# Patient Record
Sex: FEMALE | Race: WHITE | NOT HISPANIC OR LATINO | Employment: UNEMPLOYED | URBAN - METROPOLITAN AREA
[De-identification: names, ages, dates, MRNs, and addresses within clinical notes are randomized per-mention and may not be internally consistent; named-entity substitution may affect disease eponyms.]

---

## 2018-07-20 ENCOUNTER — OFFICE VISIT (OUTPATIENT)
Dept: URGENT CARE | Facility: CLINIC | Age: 53
End: 2018-07-20
Payer: MEDICAID

## 2018-07-20 VITALS
OXYGEN SATURATION: 100 % | SYSTOLIC BLOOD PRESSURE: 110 MMHG | TEMPERATURE: 99 F | WEIGHT: 143 LBS | HEIGHT: 63 IN | HEART RATE: 84 BPM | RESPIRATION RATE: 18 BRPM | DIASTOLIC BLOOD PRESSURE: 60 MMHG | BODY MASS INDEX: 25.34 KG/M2

## 2018-07-20 DIAGNOSIS — N76.0 ACUTE VAGINITIS: Primary | ICD-10-CM

## 2018-07-20 DIAGNOSIS — N89.8 VAGINAL IRRITATION: ICD-10-CM

## 2018-07-20 LAB
BILIRUB UR QL STRIP: NEGATIVE
GLUCOSE UR QL STRIP: NEGATIVE
KETONES UR QL STRIP: NEGATIVE
LEUKOCYTE ESTERASE UR QL STRIP: NEGATIVE
PH, POC UA: 6.5
POC BLOOD, URINE: NEGATIVE
POC NITRATES, URINE: NEGATIVE
PROT UR QL STRIP: NEGATIVE
SP GR UR STRIP: 1.02 (ref 1–1.03)
UROBILINOGEN UR STRIP-ACNC: NORMAL (ref 0.1–1.1)

## 2018-07-20 PROCEDURE — 81003 URINALYSIS AUTO W/O SCOPE: CPT | Mod: QW,S$GLB,, | Performed by: NURSE PRACTITIONER

## 2018-07-20 PROCEDURE — 99213 OFFICE O/P EST LOW 20 MIN: CPT | Mod: 25,S$GLB,, | Performed by: NURSE PRACTITIONER

## 2018-07-20 RX ORDER — FLUCONAZOLE 150 MG/1
150 TABLET ORAL ONCE
Qty: 2 TABLET | Refills: 0 | Status: SHIPPED | OUTPATIENT
Start: 2018-07-20 | End: 2018-07-20 | Stop reason: SDUPTHER

## 2018-07-20 RX ORDER — FLUCONAZOLE 150 MG/1
150 TABLET ORAL ONCE
Qty: 2 TABLET | Refills: 0 | Status: SHIPPED | OUTPATIENT
Start: 2018-07-20 | End: 2018-07-20

## 2018-07-20 NOTE — PATIENT INSTRUCTIONS
Atrophic Vaginitis    Atrophic vaginitis means the walls of your vagina have become thin. This happens when your body makes too little of the hormone estrogen. Menopause or surgical removal of the ovaries are the most common causes for a drop in estrogen. Breastfeeding can also cause the hormone level to drop.  Symptoms of atrophic vaginitis include:  · Dryness, soreness, burning, or itching in the vagina  · Vaginal discharge  Sex can be uncomfortable, even painful. After sex, you may have bleeding from your vagina. You may also have burning or pain when you urinate.  Home care  Your healthcare provider may recommend 1 or more of these as treatment:  · Vaginal creams, lotions, and lubricants. These products help relieve vaginal dryness. They dont need a prescription. They can be found in the personal care section of most pharmacies. Creams and lotions are used daily to help keep the vagina moist. Lubricants help reduce dryness and pain during sex. Choose water-based lubricants. Dont use petroleum jelly, mineral oil, or other oils. These increase the chance of infection.   · Hormone therapy (HT). HT increases the amount of estrogen in your body. This can help manage or relieve symptoms. HT can be given in pill form. It may be given as a lotion, cream, ring put into the vagina, or a patch on the skin. The risks and benefits of HT vary for each woman. For instance, your risk may be higher if you have had breast cancer. Discuss this treatment with your healthcare provider. Not every woman can use HT.  You dont need to give up (abstain from) sex. In fact, regular sex can help keep vaginal tissues healthy. Take steps to make sex more comfortable by using water-based lubricants.  Preventing infections  Atrophic vaginitis makes an infection of the vagina or the urinary tract more likely. To help reduce your risk:  · Keep your genitals clean. When you bathe, wash the outside of your vagina with mild soap and water.  Clean gently between the folds of your vagina.  · Wipe from front to back after a bowel movement.  · Dont douche unless your healthcare provider tells you to.  · Avoid scented toilet paper, scented vaginal sprays, and scented tampons.  · Avoid wearing clothes that are tight in the crotch. These include pantyhose, jeans, and leggings. Wear cotton underwear. Change it every day.  Follow-up care  Follow up with your healthcare provider, or as advised.  When to seek medical advice  Call your health care provider right away if any of these occur:  · Fever of 100.4°F (38°C) or higher, or as directed by your healthcare provider  · Symptoms dont go away or get worse even with treatment  · Vaginal area swells or becomes painful  · Vaginal area bleeds, but not because of your period  · Bad-smelling discharge from the vagina  · Pain or burning when you urinate or you have trouble passing urine  · Open sores develop around vagina   Date Last Reviewed: 12/1/2016  © 1865-6830 [employer]. [address] [place] [address]. All rights reserved. This information is not intended as a substitute for professional medical care. Always follow your healthcare professional's instructions.        Candida Vaginal Infection    You have a Candida vaginal infection. This is also known as a yeast infection. It is most often caused by a type of yeast (fungus) called Candida. Candida are normally found in the vagina. But if they increase in number, this can lead to infection and cause symptoms.  Symptoms of a yeast infection can include:  · Clumpy or thin, white discharge, which may look like cottage cheese  · Itching or burning  · Burning with urination  Certain factors can make a yeast infection more likely. These can include:  · Taking certain medicines, such as antibiotics or birth control pills  · Pregnancy  · Diabetes  · Weakened immune system  A yeast infection is most often treated with antifungal medicine. This  may be given as a vaginal cream or pills you take by mouth. Treatment may last for about 1 to 7 days. Women with severe or recurrent infections may need longer courses of treatment.  Home care  · If youre prescribed medicine, be sure to use it as directed. Finish all of the medicine, even if your symptoms go away. Note: Dont try to treat yourself using over-the-counter products without talking to your provider first. He or she will let you know if this is a good option for you.  · Ask your provider what steps you can take to help reduce your risk of having a yeast infection in the future.  Follow-up care  Follow up with your healthcare provider, or as directed.  When to seek medical advice  Call your healthcare provider right away if:  · You have a fever of 100.4ºF (38ºC) or higher, or as directed by your provider.  · Your symptoms worsen, or they dont go away within a few days of starting treatment.  · You have new pain in the lower belly or pelvic region.  · You have side effects that bother you or a reaction to the cream or pills youre prescribed.  · You or any partners you have sex with have new symptoms, such as a rash, joint pain, or sores.  Date Last Reviewed: 7/30/2015  © 8216-8202 [employer]. [address] [place] [address], [place] [address]. All rights reserved. This information is not intended as a substitute for professional medical care. Always follow your healthcare professional's instructions.

## 2018-07-20 NOTE — PROGRESS NOTES
"Subjective:       Patient ID: Karla Wilkins is a 53 y.o. female.    Vitals:  height is 5' 3" (1.6 m) and weight is 64.9 kg (143 lb). Her temperature is 98.7 °F (37.1 °C). Her blood pressure is 110/60 and her pulse is 84. Her respiration is 18 and oxygen saturation is 100%.     Chief Complaint: Dysuria    Dysuria    This is a new problem. The current episode started in the past 7 days. The problem has been gradually worsening. The quality of the pain is described as burning and stabbing. The pain is at a severity of 6/10. The pain is moderate. There has been no fever. The fever has been present for 5 days or more. She is sexually active. There is no history of pyelonephritis. Associated symptoms include urgency. Pertinent negatives include no chills, hematuria, nausea or vomiting. She has tried NSAIDs and increased fluids for the symptoms. The treatment provided mild relief.     Review of Systems   Constitution: Negative for chills and fever.   Skin: Negative for itching.   Musculoskeletal: Negative for back pain.   Gastrointestinal: Negative for abdominal pain, nausea and vomiting.   Genitourinary: Positive for dysuria, pelvic pain and urgency. Negative for genital sores, hematuria, missed menses and non-menstrual bleeding.       Results for orders placed or performed in visit on 07/20/18   POCT Urinalysis, Dipstick, Automated, W/O Scope   Result Value Ref Range    POC Blood, Urine Negative Negative    POC Bilirubin, Urine Negative Negative    POC Urobilinogen, Urine norm 0.1 - 1.1    POC Ketones, Urine Negative Negative    POC Protein, Urine Negative Negative    POC Nitrates, Urine Negative Negative    POC Glucose, Urine Negative Negative    pH, UA 6.5     POC Specific Gravity, Urine 1.020 1.003 - 1.029    POC Leukocytes, Urine Negative Negative       Objective:      Physical Exam   Constitutional: She is oriented to person, place, and time. She appears well-developed and well-nourished.   HENT:   Head: " Normocephalic and atraumatic.   Right Ear: External ear normal.   Left Ear: External ear normal.   Nose: Nose normal. No nasal deformity. No epistaxis.   Mouth/Throat: Oropharynx is clear and moist and mucous membranes are normal.   Eyes: Conjunctivae and lids are normal.   Neck: Trachea normal, normal range of motion and phonation normal. Neck supple.   Cardiovascular: Normal rate, regular rhythm, normal heart sounds and normal pulses.    Pulmonary/Chest: Effort normal and breath sounds normal.   Abdominal: Soft. Normal appearance and bowel sounds are normal. She exhibits no distension and no mass. There is no tenderness. There is no CVA tenderness.   Neurological: She is alert and oriented to person, place, and time.   Skin: Skin is warm, dry and intact.   Psychiatric: She has a normal mood and affect. Her speech is normal and behavior is normal. Cognition and memory are normal.   Nursing note and vitals reviewed.      Assessment:       1. Acute vaginitis    2. Vaginal irritation        Plan:         Acute vaginitis  -     fluconazole (DIFLUCAN) 150 MG Tab; Take 1 tablet (150 mg total) by mouth once. for 1 dose  Dispense: 2 tablet; Refill: 0    Vaginal irritation  -     POCT Urinalysis, Dipstick, Automated, W/O Scope  -     vaginal lubricant Inst; Place 1 application vaginally daily as needed.  Dispense: 12 each; Refill: 0    Other orders  -     Discontinue: fluconazole (DIFLUCAN) 150 MG Tab; Take 1 tablet (150 mg total) by mouth once. for 1 dose  Dispense: 2 tablet; Refill: 0  -     Discontinue: polycarbophil Gel; Place 1 application vaginally 3 (three) times a week.  Dispense: 35 g; Refill: 0

## 2018-08-06 ENCOUNTER — TELEPHONE (OUTPATIENT)
Dept: URGENT CARE | Facility: CLINIC | Age: 53
End: 2018-08-06

## 2018-08-06 NOTE — TELEPHONE ENCOUNTER
I called her pharmacy ([facility] in [place]), and the pharmacist states that they did fill the rx for the pt and the pt picked it up on the same day. I called the pt back and told the pt that we can not call in another rx for her and told her if she is still having the same issue to come back in to be re seen. She was still claiming that she did not  the medication, but I told her that I am going on what the pharmacy says and if she would like to come back in she can. She stated that she will go talk to the pharmacy

## 2021-07-01 ENCOUNTER — PATIENT MESSAGE (OUTPATIENT)
Dept: ADMINISTRATIVE | Facility: OTHER | Age: 56
End: 2021-07-01

## 2022-10-11 PROBLEM — Z86.010 HISTORY OF COLON POLYPS: Status: ACTIVE | Noted: 2022-10-11

## 2022-10-11 PROBLEM — Z86.0100 HISTORY OF COLON POLYPS: Status: ACTIVE | Noted: 2022-10-11

## 2024-04-02 PROBLEM — F33.1 MDD (MAJOR DEPRESSIVE DISORDER), RECURRENT EPISODE, MODERATE: Status: ACTIVE | Noted: 2024-04-02

## 2025-07-14 ENCOUNTER — OFFICE VISIT (OUTPATIENT)
Dept: URGENT CARE | Facility: CLINIC | Age: 60
End: 2025-07-14
Payer: MEDICAID

## 2025-07-14 VITALS
BODY MASS INDEX: 27.97 KG/M2 | DIASTOLIC BLOOD PRESSURE: 69 MMHG | HEART RATE: 105 BPM | TEMPERATURE: 99 F | SYSTOLIC BLOOD PRESSURE: 112 MMHG | OXYGEN SATURATION: 98 % | RESPIRATION RATE: 18 BRPM | WEIGHT: 157.88 LBS | HEIGHT: 63 IN
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
## 2025-07-14 DIAGNOSIS — Z23 NEED FOR VACCINATION: ICD-10-CM

## 2025-07-14 DIAGNOSIS — S69.91XA INJURY OF RIGHT WRIST, INITIAL ENCOUNTER: ICD-10-CM

## 2025-07-14 DIAGNOSIS — S60.511A ABRASION OF RIGHT HAND, INITIAL ENCOUNTER: ICD-10-CM

## 2025-07-14 DIAGNOSIS — S80.812A ABRASION OF LEFT LOWER EXTREMITY, INITIAL ENCOUNTER: ICD-10-CM

## 2025-07-14 DIAGNOSIS — S63.501A SPRAIN OF RIGHT WRIST, INITIAL ENCOUNTER: Primary | ICD-10-CM

## 2025-07-14 PROCEDURE — 99214 OFFICE O/P EST MOD 30 MIN: CPT | Mod: 25,S$GLB,, | Performed by: NURSE PRACTITIONER

## 2025-07-14 PROCEDURE — 90471 IMMUNIZATION ADMIN: CPT | Mod: S$GLB,,, | Performed by: NURSE PRACTITIONER

## 2025-07-14 PROCEDURE — 90714 TD VACC NO PRESV 7 YRS+ IM: CPT | Mod: S$GLB,,, | Performed by: NURSE PRACTITIONER

## 2025-07-14 PROCEDURE — 73110 X-RAY EXAM OF WRIST: CPT | Mod: RT,S$GLB,, | Performed by: RADIOLOGY

## 2025-07-14 RX ORDER — MUPIROCIN 20 MG/G
OINTMENT TOPICAL
Qty: 22 G | Refills: 1 | Status: SHIPPED | OUTPATIENT
Start: 2025-07-14

## 2025-07-14 RX ORDER — RIZATRIPTAN BENZOATE 10 MG/1
TABLET ORAL
COMMUNITY

## 2025-07-14 RX ORDER — MUPIROCIN 20 MG/G
OINTMENT TOPICAL
Qty: 22 G | Refills: 1 | Status: SHIPPED | OUTPATIENT
Start: 2025-07-14 | End: 2025-07-14

## 2025-07-14 NOTE — PROGRESS NOTES
"Subjective:      Patient ID: Karla Wilkins is a 60 y.o. female.    Vitals:  height is 5' 3" (1.6 m) and weight is 71.6 kg (157 lb 13.6 oz). Her oral temperature is 99 °F (37.2 °C). Her blood pressure is 112/69 and her pulse is 105. Her respiration is 18 and oxygen saturation is 98%.     Chief Complaint: Laceration    Patient fell over a speed bump and has a cut on right hand and brush burn on left knee.     Laceration   The incident occurred less than 1 hour ago. The laceration is located on the Right hand. The laceration is 2 cm in size. Injury mechanism: cement. The pain is at a severity of 6/10. The pain is moderate. The pain has been Constant since onset. It is unknown if a foreign body is present. Her tetanus status is unknown.       Skin:  Positive for laceration.      Objective:     Physical Exam   Constitutional: She is oriented to person, place, and time. She appears well-developed. She is cooperative.  Non-toxic appearance. She does not appear ill. No distress.   HENT:   Head: Normocephalic and atraumatic. Head is without abrasion, without contusion and without laceration.   Ears:   Right Ear: Hearing and external ear normal. No hemotympanum.   Left Ear: Hearing and external ear normal. No hemotympanum.   Nose: Nose normal. No mucosal edema or nasal deformity. No epistaxis. Right sinus exhibits no maxillary sinus tenderness and no frontal sinus tenderness. Left sinus exhibits no maxillary sinus tenderness and no frontal sinus tenderness.   Mouth/Throat: Uvula is midline, oropharynx is clear and moist and mucous membranes are normal. Mucous membranes are moist. No trismus in the jaw. Normal dentition. No uvula swelling.   Eyes: Conjunctivae, EOM and lids are normal. Pupils are equal, round, and reactive to light. Right eye exhibits no discharge. Left eye exhibits no discharge. No scleral icterus.   Neck: Trachea normal and phonation normal. Neck supple. No tracheal deviation present. No spinous process " tenderness present. No muscular tenderness present.   Cardiovascular: Normal rate, regular rhythm and normal pulses.   Pulmonary/Chest: Effort normal and breath sounds normal. No respiratory distress.   Abdominal: Normal appearance.   Musculoskeletal:         General: No deformity.      Right elbow: Normal.     Right wrist: She exhibits decreased range of motion, tenderness and bony tenderness. She exhibits no swelling, no effusion, no crepitus and no deformity.      Right forearm: Normal.      Right hand: She exhibits tenderness. She exhibits normal range of motion, no bony tenderness, normal two-point discrimination and normal capillary refill. Normal sensation noted. Decreased strength noted. She exhibits wrist extension trouble. She exhibits no finger abduction and no thumb/finger opposition.        Hands:         Legs:    Neurological: She is alert and oriented to person, place, and time. She has normal strength. No cranial nerve deficit or sensory deficit. She exhibits normal muscle tone. She displays no seizure activity. Coordination normal. GCS eye subscore is 4. GCS verbal subscore is 5. GCS motor subscore is 6.   Skin: Skin is warm, dry, intact, not diaphoretic and not pale. Capillary refill takes less than 2 seconds. No abrasion, No burn, No bruising and No ecchymosis   Psychiatric: Her speech is normal and behavior is normal. Judgment and thought content normal.   Nursing note and vitals reviewed.      The wounds were cleansed, debrided of foreign material as much as possible, and dressed. The patient is alerted to watch for any signs of infection (redness, pus, pain, increased swelling or fever) and call if such occurs. Home wound care instructions are provided. Tetanus vaccination status reviewed: Td vaccination indicated and given today.     Assessment:     1. Sprain of right wrist, initial encounter    2. Injury of right wrist, initial encounter    3. Fall, initial encounter    4. Abrasion of right  hand, initial encounter    5. Abrasion of left lower extremity, initial encounter    6. Need for vaccination      My preliminary reading of the X-ray:    No acute fracture or dislocation    I discussed my interpretation with the patient and advised that the final radiology report will be available in [facility]. I also discussed with the patient, if there are any changes to the treatment plan based on the final read, I will follow up via [facility] or by phone.            Plan:       Sprain of right wrist, initial encounter  -     Cancel: XR WRIST NAVICULAR VIEWS RIGHT; Future; Expected date: 07/14/2025  -     X-Ray Wrist Complete 3 views Right; Future; Expected date: 07/14/2025  -     WRIST BRACE FOR HOME USE    Injury of right wrist, initial encounter  -     X-Ray Wrist Complete 3 views Right; Future; Expected date: 07/14/2025  -     WRIST BRACE FOR HOME USE    Fall, initial encounter  -     Cancel: XR WRIST NAVICULAR VIEWS RIGHT; Future; Expected date: 07/14/2025  -     X-Ray Wrist Complete 3 views Right; Future; Expected date: 07/14/2025    Abrasion of right hand, initial encounter  -     X-Ray Wrist Complete 3 views Right; Future; Expected date: 07/14/2025  -     mupirocin (BACTROBAN) 2 % ointment; Apply to affected area 3 times daily  Dispense: 22 g; Refill: 1    Abrasion of left lower extremity, initial encounter  -     mupirocin (BACTROBAN) 2 % ointment; Apply to affected area 3 times daily  Dispense: 22 g; Refill: 1    Need for vaccination  -     Td (Tenivac) IM vaccine (>/= 6 yo)    Other orders  -     Discontinue: mupirocin (BACTROBAN) 2 % ointment; Apply to affected area 3 times daily  Dispense: 22 g; Refill: 1

## 2025-07-14 NOTE — PATIENT INSTRUCTIONS
Referral #   [id number]       -Take all medications as directed.  If you have been prescribed muscle relaxers, do NOT drive or operate heavy machinery while taking this medication.  -Rest, elevate your affected extremity above the level of the heart, and apply ice for 20 minutes at a time 3-5 times daily over the next several days.   -Wear splint/sling/brace as directed.  -Follow up with the orthopedist if you continue with pain or directed at this visit.  -Sometimes it can take up to 1 week for stress fractures to show up on an X-ray.  You may need reimaging or a CT/MRI of the affected area if significant pain persists.     If your condition fails to improve in a timely manner, you should receive another evaluation by your Primary Care Provider or Orthopedic to discuss your concerns.      If your condition worsens at any time, you should report immediately to your nearest Emergency Department for further evaluation. **You must understand that you have received Urgent Care treatment only and that you may be released before all of your medical problems are known or treated. You, the patient, are responsible to arrange for follow-up care as instructed.       WOUND CARE/ABRASIONS    Wash the wound(s) gently daily with warm water and mild soap.      Next, apply Bactroban/mupirocin (if prescribed) or Vaseline/petroleum jelly, or Aquaphor.  Avoid Neosporin or bacitracin, as this can cause allergic reactions, making the wound itchy, red, and bumpy.      After application of ointment, bandage the wound using a topical non-stick dressing. This can be purchased over the counter. The wound may have weeping of clear fluid (exudates) which seeps through the non-stick pad, so place a layer of dry, absorbent gauze over this to handle any exudates.    This type of dressing should be removed and re-applied twice daily initially, then reduced to once daily when the weeping of clear fluid decreases.    Do not let the wound get dry  and crusted.  Keeping it moist with the Vaseline/petroleum will help it heal faster.    Watch for signs or symptoms of infection such as increasing pain at the site of your wound; redness spreading around the wound edges (although a thin, light rim of redness around the wound edges initially can be normal); white, thick or foul-smelling discharge from the wound base; or unexplained fevers.  If you suspect these symptoms or have other unexplained symptoms or concerns, seek out consultation with a qualified health care professional immediately.    Once your wound is no longer oozing and raw, and shiny new pink healthy skin is visible, begin:  Gentle rubbing of the scar(s) with Vaseline/petroleum jelly to help the scar mature and flatten faster.  Silicone gel sheeting such as [employer] Scar Therapy can be used if it is raised.  Leave this on for 24 hours per day (you can leave it off for an hour or so around bathing).  If you are not able to do this, then use it nightly.  Replace the pad with a new one when old one falls off.    As the wound heals, keep in mind:  In the first few weeks, you may not feel satisfied with the appearance of your wound.  Don't get too concerned.  You won't see the final appearance of your scar for 6 months to a year, and it is very likely to fade and improve with time.    In the meantime, strict sun protection is essential. The sun can permanently darken scars.  Wear sun protective clothing and sunscreen which is at least SPF 30, broad spectrum (including UVA and UVB) with either zinc or titanium or both as active ingredients.  A thick layer and frequent application when in the sun for prolonged periods of time is essential.  You are also better off avoiding peak sun hours altogether!    [email or web address]    Your Laceration Aftercare Instructions    A laceration, or cut, is an open wound through the skin. These can be due to many different causes and  can come in many different forms. The depth, location, and cause of your wound, among multiple other factors (including your preference) plays a role in the treatment choices made today.  The main purposes of these treatments are to stop the bleeding, and to help the wound heal with reduced scarring.  Care at home depends on the type of wound and method used to close or treat the wound such as sutures, (stitches), staples, tape (steri-strips), or skin glue (Dermabond).   Keep the wound clean and dry for the first 24 hours.  The wound has probably had a bandage applied (unless skin glue was used, or if the wound was in a hard-to-bandage area, like your hair).  You can remove the bandage after 12-24 hours at your convenience.      If your wound was sutured or stapled:  You can clean the area with a mild soap and water 2 times a day. Don't use hydrogen peroxide, iodine-based solutions, or alcohol, which can slow healing, and will probably be painful!  Cover the wound with a thin layer of Bactroban/mupirocin.  Avoid Neosporin or bacitracin, as this can cause allergic reactions, making the wound itchy, red, and bumpy.  You can continue to apply the antibiotic twice daily until the wound scars and dries out, or the sutures/staples are removed.    Sutures and staples should be removed within 5 days to 2 weeks, depending on where on your body they are located.  One exception is if absorbable sutures were used-these sometimes don't require a visit for removal-I'll advise you if this was the case.      Optimal time frame for suture removal    Face     5 days  Scalp     7 days  Chest and arms   8-10  Back and Legs   10-14  Hands/fingers or Feet  10-14 days  High tension areas (joints)  10-14 days  Palms or soles   14-21 days    Keep in mind, specific situations related to your wound as well as your other medical conditions play into the optimal removal time-this is just a guideline!    If your skin was glued:  Skin  adhesive glues such as Dermabond are sometimes used instead of sutures/stitches to close cuts. When the adhesive dries, it forms a film that holds the edges of the cut together. Skin adhesives are sometimes called liquid stitches.  Typically, bandages are not placed over a wound closed with tissue adhesive glue-you can think of the glue as a dressing. Avoid lotions, ointments, etc.  This is because creams and ointments can cause the glue to prematurely slough off.  You may have some swelling, color changes, and bloody crusting on or around the wound for 2 or 3 days. This can be normal and doesn't mean the glue isn't working.  The glue will naturally slough off in about 5-7 days. At this time, scar tissue will be forming under the surface of the wound and your body will do the rest of the work of healing.  Some other important points are:  Do not scratch, rub, or pick at the adhesive.  Do not put tape directly over the adhesive.  You can shower with a skin adhesive in place, but do not soak the area in water. Do not go swimming. Be sure to gently dry the area after it gets wet.    If your hair strands were glued (hair apposition):  Sometimes, your own hair strands are used by twisting and gluing the strands of hair together, bringing the edges of a scalp laceration together.  The advantage of this is that you won't have to come back to have stitches or staples removed.  Please try to keep the area clean and dry and avoid shampoos or hair products on the area which can cause the glue to come loose prematurely.  The glue will naturally dissolve in about 5 days, after which time your hair strands will unravel.    If your wound was closed with tape:  Tape strips have the advantage of being less painful to apply and lower risk of infection, but do require more caution on your part, as they are more fragile than other skin closure techniques.  It's important to  keep the tape clean and dry  avoid picking at the tape or  rubbing the area  avoid soaking in water (showering is okay-bathing is not)  The tape strips will fall off on their own in about 5-7 days (if they don't, you can gently remove them or soak the wound in water at this time to loosen them).  At this time, scar tissue will be forming under the surface of the wound and your body will do the rest of the work of healing.    If your cut was left open:  Many cuts are often left open.  This can be for several reasons, but often is because  the risk of infection is too high to primarily close the wound (many reasons for this)  the scar that is expected without closure is cosmetically acceptable to you  This is fine, and in many cases, advisable.      After your stitches/staples/glue/tape come off:  It's very important to recognize that the most vulnerable time for a wound are the days right after the closure material has been removed.  This is when a wound is most susceptible to dehiscence (opening up).  Thus, be careful to avoid activities that could put your wound under unnecessary and excessive tension forces.  Any wound that passes through the full thickness of the skin will cause a permanent scar.  This scar may be very prominent at first but tends to gradually improve over the course of about a year. Protect your healing wound from excessive sunlight, which can darken the final appearance of a scar.  If a year has passed, and you are still not satisfied with the appearance of the scar, you can contact a plastic surgeon to discuss scar revision.    When should you call for help?  Call your doctor immediately or seek medical care in an emergency department if:  Your wound is causing new pain, or the pain gets worse.  Some pain is normal with a wound, but do not ignore pain that is getting worse instead of better. You could have an infection.  The cut starts to bleed, and blood soaks through the bandage. Oozing small amounts of blood is normal.  The skin near the cut is  cold or pale or changes color.  You have tingling, weakness, or numbness near the cut that we did not address during your visit.  You have trouble moving the area near the cut that we did not address during the visit.  You have increased pain, swelling, warmth, or redness around the cut, which could be a sign of infection.  There are red streaks leading from the cut, or there is pus draining from the cut.  You have a fever that you can't explain for another reason (like having a common cold).    What else do I need to know/do?  Being treated in an urgent care is only one step in your treatment. Even if you feel better, you still need to go to all suggested follow-up appointments and take medicines exactly as directed. This will help you recover and help prevent future problems.    Follow-up care is a key part of your treatment and safety.  Be sure to make and go to all appointments, and call your doctor if things are not going as expected.  If you've been prescribed antibiotics, take them as directed.  Do not stop taking them just because you feel better. If you don't finish the course, you can breed resistant infections, which are harder to treat!    Take good care of your wound at home to help it heal quickly and reduce your chance of infection. While your wound is healing, avoid any activity that could cause your wound to reopen.  Use common sense when it comes to activities.  Also, avoid unnecessary bacteria exposure-for example, swimming in an ocean or hot tub, or wearing shoes or gloves over a wound for a long period of time (which promotes bacterial growth).